# Patient Record
Sex: FEMALE | Race: WHITE | NOT HISPANIC OR LATINO | URBAN - NONMETROPOLITAN AREA
[De-identification: names, ages, dates, MRNs, and addresses within clinical notes are randomized per-mention and may not be internally consistent; named-entity substitution may affect disease eponyms.]

---

## 2019-10-02 ENCOUNTER — APPOINTMENT (RX ONLY)
Dept: URBAN - NONMETROPOLITAN AREA CLINIC 1 | Facility: CLINIC | Age: 69
Setting detail: DERMATOLOGY
End: 2019-10-02

## 2019-10-02 DIAGNOSIS — L30.9 DERMATITIS, UNSPECIFIED: ICD-10-CM

## 2019-10-02 PROBLEM — E78.5 HYPERLIPIDEMIA, UNSPECIFIED: Status: ACTIVE | Noted: 2019-10-02

## 2019-10-02 PROBLEM — E03.9 HYPOTHYROIDISM, UNSPECIFIED: Status: ACTIVE | Noted: 2019-10-02

## 2019-10-02 PROBLEM — K21.9 GASTRO-ESOPHAGEAL REFLUX DISEASE WITHOUT ESOPHAGITIS: Status: ACTIVE | Noted: 2019-10-02

## 2019-10-02 PROBLEM — M12.9 ARTHROPATHY, UNSPECIFIED: Status: ACTIVE | Noted: 2019-10-02

## 2019-10-02 PROCEDURE — ? REFERRAL CORRESPONDENCE

## 2019-10-02 PROCEDURE — ? BIOPSY BY PUNCH METHOD

## 2019-10-02 PROCEDURE — 11104 PUNCH BX SKIN SINGLE LESION: CPT

## 2019-10-02 ASSESSMENT — LOCATION ZONE DERM: LOCATION ZONE: LEG

## 2019-10-02 ASSESSMENT — LOCATION SIMPLE DESCRIPTION DERM: LOCATION SIMPLE: RIGHT POSTERIOR THIGH

## 2019-10-02 ASSESSMENT — LOCATION DETAILED DESCRIPTION DERM: LOCATION DETAILED: RIGHT PROXIMAL POSTERIOR THIGH

## 2019-10-02 NOTE — PROCEDURE: MIPS QUALITY
Quality 431: Preventive Care And Screening: Unhealthy Alcohol Use - Screening: Patient screened for unhealthy alcohol use using a single question and scores less than 2 times per year
Quality 111:Pneumonia Vaccination Status For Older Adults: Pneumococcal Vaccination Previously Received
Detail Level: Detailed
Quality 130: Documentation Of Current Medications In The Medical Record: Current Medications Documented
Quality 402: Tobacco Use And Help With Quitting Among Adolescents: Patient screened for tobacco and never smoked

## 2019-10-02 NOTE — HPI: RASH
What Type Of Note Output Would You Prefer (Optional)?: Bullet Format
How Severe Is Your Rash?: mild
Is This A New Presentation, Or A Follow-Up?: Rash
Additional History: Patient states she was given topicals but can’t recall names but they didn’t work. The rash isn’t itchy or bothersome just not resolved.

## 2019-10-02 NOTE — PROCEDURE: BIOPSY BY PUNCH METHOD
Post-Care Instructions: Detailed post-operative care instructions were reviewed with the patient. Patient is to keep the biopsy site dry overnight, and then apply healing ointment daily until healed.

## 2019-10-16 ENCOUNTER — APPOINTMENT (RX ONLY)
Dept: URBAN - NONMETROPOLITAN AREA CLINIC 1 | Facility: CLINIC | Age: 69
Setting detail: DERMATOLOGY
End: 2019-10-16

## 2019-10-16 DIAGNOSIS — Z48.02 ENCOUNTER FOR REMOVAL OF SUTURES: ICD-10-CM

## 2019-10-16 PROCEDURE — ? ADDITIONAL NOTES

## 2019-10-16 PROCEDURE — ? SUTURE REMOVAL (GLOBAL PERIOD)

## 2019-10-16 PROCEDURE — 99024 POSTOP FOLLOW-UP VISIT: CPT

## 2019-10-16 ASSESSMENT — LOCATION DETAILED DESCRIPTION DERM: LOCATION DETAILED: RIGHT PROXIMAL POSTERIOR THIGH

## 2019-10-16 ASSESSMENT — LOCATION ZONE DERM: LOCATION ZONE: LEG

## 2019-10-16 ASSESSMENT — LOCATION SIMPLE DESCRIPTION DERM: LOCATION SIMPLE: RIGHT POSTERIOR THIGH

## 2019-10-16 NOTE — PROCEDURE: SUTURE REMOVAL (GLOBAL PERIOD)
Detail Level: Detailed
Add 59016 Cpt? (Important Note: In 2017 The Use Of 01613 Is Being Tracked By Cms To Determine Future Global Period Reimbursement For Global Periods): yes

## 2025-03-31 NOTE — PROCEDURE: ADDITIONAL NOTES
Discharge Summary       PATIENT ID: Belkys Jones  MRN: [id number]   YOB: 1947    DATE OF ADMISSION: 3/30/2025 10:34 PM    DATE OF DISCHARGE: 3/31/2025  PRIMARY CARE PROVIDER: Claudio Olvera MD     ATTENDING PHYSICIAN: Dr. Newberry  DISCHARGING PROVIDER: KERI Aragon NP    To contact this individual call [phone] and ask the  to page.  If unavailable ask to be transferred the Adult Hospitalist Department.    CONSULTATIONS: IP CONSULT TO NEUROLOGY  IP CONSULT TO CASE MANAGEMENT    PROCEDURES/SURGERIES: * No surgery found *    ADMITTING DIAGNOSES & HOSPITAL COURSE:   Belkys Jones is a 78 y.o. female with past medical history of hypothyroidism presented to the emergency department with chief complaint of right-sided vision loss, right facial numbness, unsteady gait, headache, neck pain.  Per initial reports, symptoms onset reportedly began about 1 week ago when she started having vision changes in her right eye.  About 2 weeks ago she reportedly had a fall resulting in a black left eye.  She notedly had unsteady gait and other complaints now of headache and neck pain.  Yesterday, patient went to [facility] urgent care center where she was told that she had visual acuity of 20/100 right eye and 20/30 in left eye.  Tonight, on arrival emergency department, initial recorded vital signs were temperature 98.0 °F, /43, heart rate 74, respiratory rate 17, O2 saturation 99% on room air.  12-lead EKG shows sinus rhythm, PAC, nonspecific ST/T wave changes at 75 bpm.  Abnormal labs included sodium 132, magnesium 2.5, albumin 3.3.  CT head without IV contrast showed no acute intracranial abnormality.  CTA head neck with IV contrast showed no large vessel occlusion and no acute abnormality.  ED ordered aspirin 81 mg p.o. x 1 dose.  Patient is now seen for admission to the hospitalist service.  On arrival at the bedside, patient clarifies that her symptoms actually started 2 weeks 
Detail Level: Detailed
Additional Notes: Discussed results with patient (psoriasiform dermatitis).\\nCont. TAC 0.1% cream BID prn rash.\\nRTO if worse.